# Patient Record
Sex: MALE | Race: WHITE | NOT HISPANIC OR LATINO | Employment: FULL TIME | URBAN - METROPOLITAN AREA
[De-identification: names, ages, dates, MRNs, and addresses within clinical notes are randomized per-mention and may not be internally consistent; named-entity substitution may affect disease eponyms.]

---

## 2017-02-07 ENCOUNTER — HOSPITAL ENCOUNTER (OUTPATIENT)
Dept: RADIOLOGY | Facility: MEDICAL CENTER | Age: 57
Discharge: HOME | End: 2017-02-07
Payer: COMMERCIAL

## 2017-02-07 DIAGNOSIS — R91.8 OTHER NONSPECIFIC ABNORMAL FINDING OF LUNG FIELD: ICD-10-CM

## 2017-02-07 PROCEDURE — 71250 CT THORAX DX C-: CPT

## 2017-03-02 ENCOUNTER — ALLSCRIPTS OFFICE VISIT (OUTPATIENT)
Dept: OTHER | Facility: OTHER | Age: 57
End: 2017-03-02

## 2017-03-02 ENCOUNTER — TRANSCRIBE ORDERS (OUTPATIENT)
Dept: ADMINISTRATIVE | Facility: HOSPITAL | Age: 57
End: 2017-03-02

## 2017-03-02 DIAGNOSIS — R91.1 LUNG NODULE: Primary | ICD-10-CM

## 2018-01-12 VITALS
WEIGHT: 221 LBS | TEMPERATURE: 97.4 F | OXYGEN SATURATION: 98 % | HEART RATE: 70 BPM | HEIGHT: 77 IN | DIASTOLIC BLOOD PRESSURE: 80 MMHG | BODY MASS INDEX: 26.09 KG/M2 | SYSTOLIC BLOOD PRESSURE: 132 MMHG | RESPIRATION RATE: 18 BRPM

## 2018-02-05 DIAGNOSIS — R91.8 OTHER NONSPECIFIC ABNORMAL FINDING OF LUNG FIELD (CODE): ICD-10-CM

## 2018-02-08 ENCOUNTER — HOSPITAL ENCOUNTER (OUTPATIENT)
Dept: RADIOLOGY | Facility: MEDICAL CENTER | Age: 58
Discharge: HOME | End: 2018-02-08
Payer: COMMERCIAL

## 2018-02-08 DIAGNOSIS — R91.8 OTHER NONSPECIFIC ABNORMAL FINDING OF LUNG FIELD (CODE): ICD-10-CM

## 2018-02-08 PROCEDURE — 71250 CT THORAX DX C-: CPT

## 2018-02-14 ENCOUNTER — OFFICE VISIT (OUTPATIENT)
Dept: PULMONOLOGY | Facility: CLINIC | Age: 58
End: 2018-02-14
Payer: COMMERCIAL

## 2018-02-14 VITALS
HEIGHT: 77 IN | SYSTOLIC BLOOD PRESSURE: 138 MMHG | DIASTOLIC BLOOD PRESSURE: 80 MMHG | HEART RATE: 72 BPM | BODY MASS INDEX: 26.21 KG/M2 | TEMPERATURE: 98.4 F | OXYGEN SATURATION: 96 % | WEIGHT: 222 LBS

## 2018-02-14 DIAGNOSIS — R05.3 COUGH, PERSISTENT: ICD-10-CM

## 2018-02-14 DIAGNOSIS — J43.2 CENTRILOBULAR EMPHYSEMA (HCC): Primary | ICD-10-CM

## 2018-02-14 DIAGNOSIS — R91.8 MULTIPLE PULMONARY NODULES: ICD-10-CM

## 2018-02-14 PROCEDURE — 99214 OFFICE O/P EST MOD 30 MIN: CPT | Performed by: INTERNAL MEDICINE

## 2018-02-14 RX ORDER — AMLODIPINE BESYLATE 5 MG/1
5 TABLET ORAL DAILY
Refills: 3 | COMMUNITY
Start: 2017-12-17

## 2018-02-14 RX ORDER — BENAZEPRIL HYDROCHLORIDE 40 MG/1
40 TABLET, FILM COATED ORAL DAILY
Refills: 3 | COMMUNITY
Start: 2017-12-17

## 2018-02-14 RX ORDER — SIMVASTATIN 20 MG
20 TABLET ORAL DAILY
Refills: 3 | COMMUNITY
Start: 2017-12-17

## 2018-02-14 RX ORDER — ASPIRIN 81 MG/1
1 TABLET, CHEWABLE ORAL DAILY
COMMUNITY
Start: 2016-09-01 | End: 2022-06-28 | Stop reason: ALTCHOICE

## 2018-02-14 NOTE — LETTER
February 14, 2018     Tommie Swan DO  [address] [place], [place] [id number]    Patient: Julio Moritz   YOB: 1960   Date of Visit: 2/14/2018       Dear Dr Mina Martinez: Thank you for referring Shira Ragsdale to me for evaluation  Below are my notes for this consultation  If you have questions, please do not hesitate to call me  I look forward to following your patient along with you  Sincerely,        Jared Platt MD        CC: No Recipients  Jared Platt MD  2/14/2018  9:26 PM  Signed    Progress note - Pulmonary Medicine   Julio Moritz 65 y o  male MRN: [id number]        Pulmonary emphysema ([address] )  · Fairly sedentary due to hip issues and not particularly symptomatic  · Continue to monitor off medications  If symptoms worsen he will contact me and be re-evaluated for possible bronchodilator therapy    Multiple pulmonary nodules  · CT scan imaging was reviewed with him  · Radiologist's recommendation for a follow-up CT in 6-12 months to complete 2 years of imaging was also discussed with him  · He does not wish to have any further CT scans  He does have 18 months of stability  He is no longer a smoker  The largest nodule is 4 mm in size  We will defer any further follow-up imaging per patient request    Cough, persistent  · I suspect this is secondary to esophageal reflux symptoms and some mild associated nasal drip  He is not currently on any medications for this  I did suggest a trial of Zantac or over-the-counter agent for esophageal reflux  If nasal symptoms become more significant, trial of fluticasone nasal spray or possibly an antihistamine may be of benefit  · It is unlikely that this is related to his emphysema given that he does not have sputum production, wheezing, or other signs to suggest pulmonary origin    Recommendations were provided to Wendy Troy today    He prefers to have follow-up as needed if his symptoms are worse or not improving     ______________________________________________________________________    HPI:    Otoniel Humphreys presents today for follow-up of cough and abnormal CT scan of the chest   He has been having a cough for several months  He notices some postnasal drip and has intermittent heartburn as well  He does not have any wheezing or associated phlegm production  He is on an ACE-inhibitor chronically for hypertension management  He has not had fever, chills, or infection symptoms  His weight has been stable  He has not had any signs or symptoms of malignancy  He has been fairly sedentary but due to issues with arthritis of his right hip and is contemplating evaluation for hip replacement surgery  He used to walk the dog over a mi a day and now is not able to do so  His exercise has been significantly limited  Additionally he has been having some discomfort with mobility of his tongue  This seems to be occurring at the base of the tongue on the right  His tongue seems to fatigue and cramp and he has to stop eating as a result  He does not have any history of oral pharyngeal abnormality or salivary abnormality  He does have dentures  He plans on seen ear nose and throat physician  I recommended that he follow through with that and have it evaluated further      Review of Systems:  Review of Systems   Constitutional:        As per HPI   HENT:        As per HPI   Eyes: Negative  Respiratory:        As per HPI   Cardiovascular: Negative for chest pain, palpitations and leg swelling  Gastrointestinal: Negative for abdominal pain  No GERD symptoms   Endocrine: Negative  Musculoskeletal: Positive for arthralgias (Right hip) and gait problem  Skin: Negative  Allergic/Immunologic: Negative for environmental allergies  Hematological: Negative  Psychiatric/Behavioral: Negative  All other systems reviewed and are negative          Social history updates:  History   Smoking Status    Former Smoker    Packs/day: 1 00    Years: 24 00    Types: Cigarettes    Start date: 1979    Quit date: 2004   Smokeless Tobacco    Never Used         PhysicalExamination:  Vitals:   /80   Pulse 72   Temp 98 4 °F (36 9 °C)   Ht 6' 5" (1 956 m)   Wt 101 kg (222 lb)   SpO2 96%   BMI 26 33 kg/m²      Gen:   Appears comfortable on room air without any respiratory difficulty   HEENT: PERRL  Oropharynx is clear, moist   Notable upper denture  Inspected with a tongue depressor  I do not appreciate any abnormality at the base of the tongue the right or any fullness in the submandibular for salivary gland  Neck: Supple  There is no JVD, lymphadenopathy or thyromegaly  Trachea is midline  Chest:  Chest excursion symmetric  Lungs are hyperinflated  Breath sounds are distant bilaterally but otherwise clear  Very slightly Hyper-resonant to percussion  Cardiac:  Regular  There are no murmurs  Abdomen: Soft and nontender  Benign  Extremities: No clubbing, cyanosis or edema  Neurologic: No focal deficits  Normal affect  Skin: No appreciable rashes  Diagnostic Data:  Labs: I personally reviewed the most recent laboratory data pertinent to today's visit  No recent blood work    PFT results: The most recent pulmonary function tests were reviewed  Spirometry performed in 2016 showed severe obstruction with an FEV1 of 57% predicted    Imaging:  I personally reviewed the images on the [facility] system pertinent to today's visit  CT scan of the chest shows stable emphysema changes there have been 18 months of stability in his pulmonary nodules as well    No new findings      Moe Hwang MD

## 2018-02-15 NOTE — ASSESSMENT & PLAN NOTE
· CT scan imaging was reviewed with him  · Radiologist's recommendation for a follow-up CT in 6-12 months to complete 2 years of imaging was also discussed with him  · He does not wish to have any further CT scans  He does have 18 months of stability  He is no longer a smoker  The largest nodule is 4 mm in size    We will defer any further follow-up imaging per patient request

## 2018-02-15 NOTE — ASSESSMENT & PLAN NOTE
· I suspect this is secondary to esophageal reflux symptoms and some mild associated nasal drip  He is not currently on any medications for this  I did suggest a trial of Zantac or over-the-counter agent for esophageal reflux  If nasal symptoms become more significant, trial of fluticasone nasal spray or possibly an antihistamine may be of benefit  · It is unlikely that this is related to his emphysema given that he does not have sputum production, wheezing, or other signs to suggest pulmonary origin  · If this persists in more protracted fashion, his Ace inhibitor therapy could be reconsidered since he does have benazepril therapy

## 2018-02-15 NOTE — PROGRESS NOTES
Progress note - Pulmonary Medicine   [facility], [facility]  [facility] 65 [facility]  [facility] MRN: [id number]        Pulmonary emphysema ([address] )  · Fairly sedentary due to hip issues and not particularly symptomatic  · Continue to monitor off medications  If symptoms worsen he will contact me and be re-evaluated for possible bronchodilator therapy    Multiple pulmonary nodules  · CT scan imaging was reviewed with him  · Radiologist's recommendation for a follow-up CT in 6-12 months to complete 2 years of imaging was also discussed with him  · He does not wish to have any further CT scans  He does have 18 months of stability  He is no longer a smoker  The largest nodule is 4 mm in size  We will defer any further follow-up imaging per patient request    Cough, persistent  · I suspect this is secondary to esophageal reflux symptoms and some mild associated nasal drip  He is not currently on any medications for this  I did suggest a trial of Zantac or over-the-counter agent for esophageal reflux  If nasal symptoms become more significant, trial of fluticasone nasal spray or possibly an antihistamine may be of benefit  · It is unlikely that this is related to his emphysema given that he does not have sputum production, wheezing, or other signs to suggest pulmonary origin    Recommendations were provided to Rajendra Catherine today  He prefers to have follow-up as needed if his symptoms are worse or not improving     ______________________________________________________________________    HPI:    Korey Ortez presents today for follow-up of cough and abnormal CT scan of the chest   He has been having a cough for several months  He notices some postnasal drip and has intermittent heartburn as well  He does not have any wheezing or associated phlegm production  He is on an ACE-inhibitor chronically for hypertension management  He has not had fever, chills, or infection symptoms  His weight has been stable    He has not had any signs or symptoms of malignancy  He has been fairly sedentary but due to issues with arthritis of his right hip and is contemplating evaluation for hip replacement surgery  He used to walk the dog over a mi a day and now is not able to do so  His exercise has been significantly limited  Additionally he has been having some discomfort with mobility of his tongue  This seems to be occurring at the base of the tongue on the right  His tongue seems to fatigue and cramp and he has to stop eating as a result  He does not have any history of oral pharyngeal abnormality or salivary abnormality  He does have dentures  He plans on seen ear nose and throat physician  I recommended that he follow through with that and have it evaluated further      Review of Systems:  Review of Systems   Constitutional:        As per HPI   HENT:        As per HPI   Eyes: Negative  Respiratory:        As per HPI   Cardiovascular: Negative for chest pain, palpitations and leg swelling  Gastrointestinal: Negative for abdominal pain  No GERD symptoms   Endocrine: Negative  Musculoskeletal: Positive for arthralgias (Right hip) and gait problem  Skin: Negative  Allergic/Immunologic: Negative for environmental allergies  Hematological: Negative  Psychiatric/Behavioral: Negative  All other systems reviewed and are negative  Social history updates:  History   Smoking Status    Former Smoker    Packs/day: 1 00    Years: 24 00    Types: Cigarettes    Start date: 1979    Quit date: 2004   Smokeless Tobacco    Never Used         PhysicalExamination:  Vitals:   /80   Pulse 72   Temp 98 4 °F (36 9 °C)   Ht 6' 5" (1 956 m)   Wt 101 kg (222 lb)   SpO2 96%   BMI 26 33 kg/m²     Gen:   Appears comfortable on room air without any respiratory difficulty   HEENT: PERRL  Oropharynx is clear, moist   Notable upper denture  Inspected with a tongue depressor    I do not appreciate any abnormality at the base of the tongue the right or any fullness in the submandibular for salivary gland  Neck: Supple  There is no JVD, lymphadenopathy or thyromegaly  Trachea is midline  Chest:  Chest excursion symmetric  Lungs are hyperinflated  Breath sounds are distant bilaterally but otherwise clear  Very slightly Hyper-resonant to percussion  Cardiac:  Regular  There are no murmurs  Abdomen: Soft and nontender  Benign  Extremities: No clubbing, cyanosis or edema  Neurologic: No focal deficits  Normal affect  Skin: No appreciable rashes  Diagnostic Data:  Labs: I personally reviewed the most recent laboratory data pertinent to today's visit  No recent blood work    PFT results: The most recent pulmonary function tests were reviewed  Spirometry performed in 2016 showed severe obstruction with an FEV1 of 57% predicted    Imaging:  I personally reviewed the images on the [facility] system pertinent to today's visit  CT scan of the chest shows stable emphysema changes there have been 18 months of stability in his pulmonary nodules as well    No new findings      Sylvie Littlejohn MD

## 2018-02-15 NOTE — ASSESSMENT & PLAN NOTE
· Fairly sedentary due to hip issues and not particularly symptomatic  · Continue to monitor off medications    If symptoms worsen he will contact me and be re-evaluated for possible bronchodilator therapy

## 2018-02-15 NOTE — PATIENT INSTRUCTIONS
· Consider trial of Zantac or Pepcid for your heartburn which may help with her cough  · Keep track of postnasal drip and consider over-the-counter treatment for this if it is not improving  · Follow-up with ear nose and throat regarding the complaints of tongue discomfort while eating  · If the cough does not improve over time, consider having a or benazepril changed to an alternative medication  Benazepril is an ACE-inhibitor which can sometimes cause cough in up to 40% of patient that take this medication  · The pulmonary nodules that you have have been stable for 18 months  Recommendation by Radiology was for 1 additional follow-up CT scan in 6-12 months  Please call if you reconsider having the scan performed    I have recommended a single 1 year follow-up

## 2018-04-03 ENCOUNTER — TRANSCRIBE ORDERS (OUTPATIENT)
Dept: ADMINISTRATIVE | Facility: HOSPITAL | Age: 58
End: 2018-04-03

## 2018-04-03 DIAGNOSIS — K14.0 GLOSSITIS: ICD-10-CM

## 2018-04-03 DIAGNOSIS — R13.14 DYSPHAGIA, PHARYNGOESOPHAGEAL PHASE: Primary | ICD-10-CM

## 2018-04-20 ENCOUNTER — APPOINTMENT (OUTPATIENT)
Dept: LAB | Facility: MEDICAL CENTER | Age: 58
End: 2018-04-20
Payer: COMMERCIAL

## 2018-04-20 ENCOUNTER — HOSPITAL ENCOUNTER (OUTPATIENT)
Dept: RADIOLOGY | Facility: MEDICAL CENTER | Age: 58
Discharge: HOME | End: 2018-04-20
Payer: COMMERCIAL

## 2018-04-20 ENCOUNTER — TRANSCRIBE ORDERS (OUTPATIENT)
Dept: ADMINISTRATIVE | Facility: HOSPITAL | Age: 58
End: 2018-04-20

## 2018-04-20 DIAGNOSIS — K14.0 GLOSSITIS: ICD-10-CM

## 2018-04-20 DIAGNOSIS — R13.14 DYSPHAGIA, PHARYNGOESOPHAGEAL PHASE: ICD-10-CM

## 2018-04-20 DIAGNOSIS — R13.14 DYSPHAGIA, PHARYNGOESOPHAGEAL PHASE: Primary | ICD-10-CM

## 2018-04-20 LAB
ALBUMIN SERPL BCP-MCNC: 4.4 G/DL (ref 3.5–5)
ALP SERPL-CCNC: 77 U/L (ref 46–116)
ALT SERPL W P-5'-P-CCNC: 26 U/L (ref 12–78)
ANION GAP SERPL CALCULATED.3IONS-SCNC: 3 MMOL/L (ref 4–13)
AST SERPL W P-5'-P-CCNC: 15 U/L (ref 5–45)
BILIRUB SERPL-MCNC: 1.05 MG/DL (ref 0.2–1)
BUN SERPL-MCNC: 9 MG/DL (ref 5–25)
CALCIUM SERPL-MCNC: 9.4 MG/DL (ref 8.3–10.1)
CHLORIDE SERPL-SCNC: 109 MMOL/L (ref 100–108)
CO2 SERPL-SCNC: 29 MMOL/L (ref 21–32)
CREAT SERPL-MCNC: 1.12 MG/DL (ref 0.6–1.3)
GFR SERPL CREATININE-BSD FRML MDRD: 73 ML/MIN/1.73SQ M
GLUCOSE P FAST SERPL-MCNC: 94 MG/DL (ref 65–99)
POTASSIUM SERPL-SCNC: 4.5 MMOL/L (ref 3.5–5.3)
PROT SERPL-MCNC: 7.8 G/DL (ref 6.4–8.2)
SODIUM SERPL-SCNC: 141 MMOL/L (ref 136–145)

## 2018-04-20 PROCEDURE — 36415 COLL VENOUS BLD VENIPUNCTURE: CPT | Performed by: OTOLARYNGOLOGY

## 2018-04-20 PROCEDURE — 80053 COMPREHEN METABOLIC PANEL: CPT | Performed by: OTOLARYNGOLOGY

## 2018-04-25 ENCOUNTER — HOSPITAL ENCOUNTER (OUTPATIENT)
Dept: RADIOLOGY | Facility: MEDICAL CENTER | Age: 58
Discharge: HOME | End: 2018-04-25
Payer: COMMERCIAL

## 2018-04-25 PROCEDURE — 70491 CT SOFT TISSUE NECK W/DYE: CPT

## 2018-04-25 RX ADMIN — IOHEXOL 85 ML: 350 INJECTION, SOLUTION INTRAVENOUS at 09:42

## 2019-03-20 ENCOUNTER — OFFICE VISIT (OUTPATIENT)
Dept: PULMONOLOGY | Facility: CLINIC | Age: 59
End: 2019-03-20
Payer: COMMERCIAL

## 2019-03-20 VITALS
OXYGEN SATURATION: 98 % | WEIGHT: 221.4 LBS | TEMPERATURE: 96.7 F | HEART RATE: 74 BPM | DIASTOLIC BLOOD PRESSURE: 98 MMHG | SYSTOLIC BLOOD PRESSURE: 148 MMHG | HEIGHT: 76 IN | BODY MASS INDEX: 26.96 KG/M2 | RESPIRATION RATE: 18 BRPM

## 2019-03-20 DIAGNOSIS — R05.3 COUGH, PERSISTENT: ICD-10-CM

## 2019-03-20 DIAGNOSIS — J42 CHRONIC BRONCHITIS, UNSPECIFIED CHRONIC BRONCHITIS TYPE (HCC): Primary | ICD-10-CM

## 2019-03-20 DIAGNOSIS — J43.2 CENTRILOBULAR EMPHYSEMA (HCC): ICD-10-CM

## 2019-03-20 PROCEDURE — 94010 BREATHING CAPACITY TEST: CPT | Performed by: INTERNAL MEDICINE

## 2019-03-20 PROCEDURE — 99214 OFFICE O/P EST MOD 30 MIN: CPT | Performed by: INTERNAL MEDICINE

## 2019-03-20 RX ORDER — ALBUTEROL SULFATE 90 UG/1
2 AEROSOL, METERED RESPIRATORY (INHALATION) EVERY 6 HOURS PRN
Qty: 1 INHALER | Refills: 6 | Status: SHIPPED | OUTPATIENT
Start: 2019-03-20 | End: 2020-03-31 | Stop reason: SDUPTHER

## 2019-03-20 NOTE — PROGRESS NOTES
Progress note - Pulmonary Medicine   [facility], [facility]  AND [facility] 63 y o  male MRN: [id number]       Impression & Plan:     Pulmonary emphysema ([address] )  · I gave him a sample of Anoro Ellipta to try to see if this helps any of his respiratory symptoms  If it does he was also given a prescription to fill  · If Haseeb Hove is not helpful, he will try rescue albuterol in the winter months which seem to be when his flares occur  Prescription was given for this as well    Cough, persistent  · Dry nonproductive and seasonal cough  · This may be related to an element of bronchospasm and inhalers will be tried typically improves during the spring and summer months  · If symptoms escalate, will re-evaluate this winter  Otherwise trial of bronchodilators as outlined above      Diagnoses and all orders for this visit:    Chronic bronchitis, unspecified chronic bronchitis type ([address] )  -     POCT spirometry    Centrilobular emphysema (HCC)  -     albuterol (PROVENTIL HFA,VENTOLIN HFA) 90 mcg/act inhaler; Inhale 2 puffs every 6 (six) hours as needed for wheezing  -     umeclidinium-vilanterol (ANORO ELLIPTA) 62 5-25 MCG/INH inhaler; Inhale 1 puff daily    Cough, persistent  -     albuterol (PROVENTIL HFA,VENTOLIN HFA) 90 mcg/act inhaler; Inhale 2 puffs every 6 (six) hours as needed for wheezing      ______________________________________________________________________    HPI:    Nahun Fontaine presents today for follow-up of emphysema/COPD  He had been doing well since our last visit until this winter when he developed significant chest tightness in the right upper lobe and a dry cough  He did not seek medical attention for this but was very aware of the discomfort and shortness of Breath  He did not typically wheeze  He did not typically have phlegm production  He did have a cough which was dry and bothersome  He did not recollect any fever or infection no sinusitis or postnasal drip    No sore throat and no GERD symptoms    He has not had any cardiac chest pain  No leg swelling or palpitations  Symptoms were not reproducible and he is not having musculoskeletal injury to the chest   Findings were similar to his breathing problems in the past        Review of Systems:  Review of Systems   Constitutional: Negative for activity change, appetite change, fatigue, fever and unexpected weight change  HENT: Negative for postnasal drip, sinus pain, sore throat and voice change  Cardiovascular: Negative  Gastrointestinal: Negative  Endocrine: Negative  Musculoskeletal: Negative  Allergic/Immunologic: Negative for environmental allergies  Neurological: Negative for headaches  All other systems reviewed and are negative  Past medical history, surgical history, and family history were reviewed and updated as appropriate    Social history updates:  Social History     Tobacco Use   Smoking Status Former Smoker    Packs/day: 1 00    Years: 24 00    Pack years: 24 00    Types: Cigarettes    Start date: 1979    Last attempt to quit: 2004    Years since quitting: 15 2   Smokeless Tobacco Never Used       PhysicalExamination:  Vitals:   /98 (BP Location: Left arm, Patient Position: Sitting, Cuff Size: Large)   Pulse 74   Temp (!) 96 7 °F (35 9 °C) (Tympanic)   Resp 18   Ht 6' 4" (1 93 m)   Wt 100 kg (221 lb 6 4 oz)   SpO2 98% Comment: room air  BMI 26 95 kg/m²     Physical Exam:   Gen: Comfortable  Non-labored  Normal weight/stature  HEENT: PERRL  O/P: clear  moist  Neck: Trachea is midline  No JVD  No adenopathy  No thyromegaly  Chest:  Symmetric excursion  Clear lung fields without wheezes, rales, or rhonchi  Cardiac:  Regular  no murmur  Abdomen: Soft and nontender  Bowel sounds are present  Extremities: No edema  Neuro: no impaired mentation, good historian    Diagnostic Data:  Labs:   I personally reviewed the most recent laboratory data pertinent to today's visit    No results found for: WBC, HGB, HCT, MCV, PLT  Lab Results   Component Value Date    SODIUM 141 04/20/2018    K 4 5 04/20/2018    CO2 29 04/20/2018     (H) 04/20/2018    BUN 9 04/20/2018    CREATININE 1 12 04/20/2018    CALCIUM 9 4 04/20/2018     No results found for: IGE  Lab Results   Component Value Date    ALT 26 04/20/2018    AST 15 04/20/2018    ALKPHOS 77 04/20/2018       PFT results: The most recent pulmonary function tests were reviewed  Spirometry performed in the office today showed FEV1 of 2 36 L which is 53% predicted which was a slight decline from prior FEV1 of 2 69 L which was 57% predicted    Imaging:  I personally [facility] pertinent to today's visit  He had 3 consecutive CT scans for small lung nodules all    Last imaging in 2018 and was unremarkable for significant  change or concerning nodule     Tayler Silverio MD

## 2019-03-20 NOTE — ASSESSMENT & PLAN NOTE
· I gave him a sample of Anoro Ellipta to try to see if this helps any of his respiratory symptoms  If it does he was also given a prescription to fill  · If Haseeb Hove is not helpful, he will try rescue albuterol in the winter months which seem to be when his flares occur    Prescription was given for this as well

## 2019-03-20 NOTE — ASSESSMENT & PLAN NOTE
· Dry nonproductive and seasonal cough  · This may be related to an element of bronchospasm and inhalers will be tried typically improves during the spring and summer months  · If symptoms escalate, will re-evaluate this winter    Otherwise trial of bronchodilators as outlined above

## 2020-03-31 ENCOUNTER — TELEMEDICINE (OUTPATIENT)
Dept: PULMONOLOGY | Facility: CLINIC | Age: 60
End: 2020-03-31
Payer: COMMERCIAL

## 2020-03-31 DIAGNOSIS — R07.89 ATYPICAL CHEST PAIN: ICD-10-CM

## 2020-03-31 DIAGNOSIS — R91.8 MULTIPLE PULMONARY NODULES: ICD-10-CM

## 2020-03-31 DIAGNOSIS — J43.2 CENTRILOBULAR EMPHYSEMA (HCC): Primary | ICD-10-CM

## 2020-03-31 DIAGNOSIS — R05.3 COUGH, PERSISTENT: ICD-10-CM

## 2020-03-31 PROCEDURE — 99214 OFFICE O/P EST MOD 30 MIN: CPT | Performed by: INTERNAL MEDICINE

## 2020-03-31 RX ORDER — ALBUTEROL SULFATE 90 UG/1
2 AEROSOL, METERED RESPIRATORY (INHALATION) EVERY 6 HOURS PRN
Qty: 1 INHALER | Refills: 6 | Status: SHIPPED | OUTPATIENT
Start: 2020-03-31 | End: 2022-02-04

## 2020-03-31 NOTE — LETTER
March 31, 2020     Pacheco Zhong DO  [address] [address]    Patient: Catracho Ashraf   YOB: 1960   Date of Visit: 3/31/2020       Dear Dr Chantel Menendez: Thank you for referring Santana Levine to me for evaluation  Below are my notes for this consultation  If you have questions, please do not hesitate to call me  I look forward to following your patient along with you  Sincerely,        Laura Winters MD        CC: No Recipients  Laura Winters MD  3/31/2020  2:58 PM  Sign at close encounter  Virtual Regular Visit    Reason for visit is follow-up of emphysema and atypical chest wall pain    This virtual check-in was done via [employer] Duo and patient was informed that this is not a secure, HIPAA-complaint platform  he agrees to proceed       Encounter provider Laura Winters MD    Provider located at [address] [facility]      Recent Visits  No visits were found meeting these conditions  Showing recent visits within past 7 days and meeting all other requirements     Today's Visits  Date Type Provider Dept   03/31/20 Telemedicine Laura Winters MD [facility]   Showing today's visits and meeting all other requirements     Future Appointments  No visits were found meeting these conditions  Showing future appointments within next 150 days and meeting all other requirements        Patient agrees to participate in a virtual check in via telephone or video visit instead of presenting to the office to address urgent/immediate medical needs  Patient is aware this is a billable service  After connecting through televideo, the patient was identified by name and date of birth  Catracho Ashraf was informed that this was a telemedicine visit and that the exam was being conducted confidentially over secure lines  My office door was closed   No one else was in the room  He acknowledged consent and understanding of privacy and security of the virtual check-in visit  I informed the patient that I have reviewed his record in Epic and presented the opportunity for him to ask any questions regarding the visit today  The patient initiated communication and agreed to participate  Progress note - Pulmonary Medicine   [facility], [facility]  AND [facility] 63 y o  male MRN: [id number]       Impression & Plan:     Pulmonary emphysema ([address] )  · Symptoms are stable  · He has not had any worsening emphysema symptoms  · Did not notice a benefit with Anoro Ellipta and has only been using albuterol intermittently  · At this point will continue with just intermittent albuterol  Prescription renewed per patient request    Multiple pulmonary nodules  · He has a he 4 mm left lower lobe nodule that was present previously  He declined further workup at that time  · Questioning whether he should have a CT scan at this point but based on the size of prior nodule, risk factors and remote nature of his smoking history  Follow-up imaging would not be recommended based on Fleischner Society guidelines and lung cancer screening criteria  Atypical chest pain  · Continues to have this sporadically without rhyme or reason  Symptoms typically last about 10 days and are self-limited  · No clear structural origin and likely musculoskeletal most likely          Virtual visit time component:  Total visit time for chart and diagnostic data review, telephonic or video conference communication with the patient, and documentation:  21 minutes    I have personally spent 6 minutes reviewing the chart and imaging prior to the visit  I have personally spent 13 minutes on the phone with the patient       I have personally spent 3 minutes reviewing imaging, laboratory results and other testing results with the patient     ______________________________________________________________________    HPI: Sarahi Snyder is being evaluated by virtual visit for follow-up of emphysema and atypical chest wall pain  He has been fairly stable  He tried [employer] after the last visit but did not notice any benefit  He return to using albuterol alone which he uses during the day time and fairly sporadically  He continues to have occasional chest wall pain  This occurs every couple of months and may last for up to 10 days  It seems to be self limited any does not require any pain medication for this  He is not aware of a triggering issue and remains somewhat puzzled as to why he continues to have the discomfort  He has never had any structural abnormality to explain this phenomenon  He otherwise continues to work during the [facility] [employer]  He works for [email or web address]  They do have a waiver to continue operations but he is wearing a mask and maintaining 6 ft distancing  He denies cough  No wheezing  He occasionally gets chest tightness  No lightheadedness or dizziness  No sore throat  No postnasal drip  No GERD symptoms  Weight and appetite have been stable    No fever, chills, or other constitutional complaints        Current Medications:    Current Outpatient Medications:     albuterol (PROVENTIL HFA,VENTOLIN HFA) 90 mcg/act inhaler, Inhale 2 puffs every 6 (six) hours as needed for wheezing, Disp: 1 Inhaler, Rfl: 6    amLODIPine (NORVASC) 5 mg tablet, Take 5 mg by mouth daily, Disp: , Rfl: 3    aspirin 81 mg chewable tablet, Chew 1 tablet daily, Disp: , Rfl:     benazepril (LOTENSIN) 40 MG tablet, Take 40 mg by mouth daily, Disp: , Rfl: 3    Fish Oil-Coenzyme Q10 (FISH OIL PLUS CO Q-10) 1000-30 MG CAPS, Take by mouth, Disp: , Rfl:     simvastatin (ZOCOR) 20 mg tablet, Take 20 mg by mouth daily, Disp: , Rfl: 3    Review of Systems:  Aside from what is mentioned in the HPI, the review of systems is otherwise negative  Past medical history, surgical history, and family history were reviewed and updated as appropriate    Social history updates:  Social History     Tobacco Use   Smoking Status Former Smoker    Packs/day: 1 00    Years: 24 00    Pack years: 24 00    Types: Cigarettes    Start date:     Last attempt to quit: 2004    Years since quittin 2   Smokeless Tobacco Never Used       PhysicalExamination:  Patient was evaluated telephonically, no physical examination could be performed  Patient was briefly seen on tele video but unable to maintain video conferencing      Diagnostic Data:    Imaging:  No new recent imagine     Hoda Zheng MD

## 2020-03-31 NOTE — PROGRESS NOTES
Virtual Regular Visit    Reason for visit is follow-up of emphysema and atypical chest wall pain    This virtual check-in was done via [employer] Duo and patient was informed that this is not a secure, HIPAA-complaint platform  he agrees to proceed       Encounter provider Eda Bill MD    Provider located at [address] [facility]      Recent Visits  No visits were found meeting these conditions  Showing recent visits within past 7 days and meeting all other requirements     Today's Visits  Date Type Provider Dept   03/31/20 Telemedicine Eda Bill MD [facility]   Showing today's visits and meeting all other requirements     Future Appointments  No visits were found meeting these conditions  Showing future appointments within next 150 days and meeting all other requirements        Patient agrees to participate in a virtual check in via telephone or video visit instead of presenting to the office to address urgent/immediate medical needs  Patient is aware this is a billable service  After connecting through [employer], the patient was identified by name and date of birth  Lai Martínez was informed that this was a telemedicine visit and that the exam was being conducted confidentially over secure lines  My office door was closed  No one else was in the room  He acknowledged consent and understanding of privacy and security of the virtual check-in visit  I informed the patient that I have reviewed his record in Epic and presented the opportunity for him to ask any questions regarding the visit today  The patient initiated communication and agreed to participate        Progress note - Pulmonary Medicine   [facility], [facility]  AND [facility] 63 y o  male MRN: [id number]       Impression & Plan:     Pulmonary emphysema ([address] )  · Symptoms are stable  · He has not had any worsening emphysema symptoms  · Did not notice a benefit with Anoro Ellipta and has only been using albuterol intermittently  · At this point will continue with just intermittent albuterol  Prescription renewed per patient request    Multiple pulmonary nodules  · He has a he 4 mm left lower lobe nodule that was present previously  He declined further workup at that time  · Questioning whether he should have a CT scan at this point but based on the size of prior nodule, risk factors and remote nature of his smoking history  Follow-up imaging would not be recommended based on Fleischner Society guidelines and lung cancer screening criteria  Atypical chest pain  · Continues to have this sporadically without rhyme or reason  Symptoms typically last about 10 days and are self-limited  · No clear structural origin and likely musculoskeletal most likely          Virtual visit time component:  Total visit time for chart and diagnostic data review, telephonic or video conference communication with the patient, and documentation:  21 minutes    I have personally spent 6 minutes reviewing the chart and imaging prior to the visit  I have personally spent 13 minutes on the phone with the patient  I have personally spent 3 minutes reviewing imaging, laboratory results and other testing results with the patient     ______________________________________________________________________    HPI:    Val Howell is being evaluated by virtual visit for follow-up of emphysema and atypical chest wall pain  He has been fairly stable  He tried [employer] after the last visit but did not notice any benefit  He return to using albuterol alone which he uses during the day time and fairly sporadically  He continues to have occasional chest wall pain  This occurs every couple of months and may last for up to 10 days  It seems to be self limited any does not require any pain medication for this    He is not aware of a triggering issue and remains somewhat puzzled as to why he continues to have the discomfort  He has never had any structural abnormality to explain this phenomenon  He otherwise continues to work during the COVID-23 crisis  He works for [employer]  They do have a waiver to continue operations but he is wearing a mask and maintaining 6 ft distancing  He denies cough  No wheezing  He occasionally gets chest tightness  No lightheadedness or dizziness  No sore throat  No postnasal drip  No GERD symptoms  Weight and appetite have been stable  No fever, chills, or other constitutional complaints        Current Medications:    Current Outpatient Medications:     albuterol (PROVENTIL HFA,VENTOLIN HFA) 90 mcg/act inhaler, Inhale 2 puffs every 6 (six) hours as needed for wheezing, Disp: 1 Inhaler, Rfl: 6    amLODIPine (NORVASC) 5 mg tablet, Take 5 mg by mouth daily, Disp: , Rfl: 3    aspirin 81 mg chewable tablet, Chew 1 tablet daily, Disp: , Rfl:     benazepril (LOTENSIN) 40 MG tablet, Take 40 mg by mouth daily, Disp: , Rfl: 3    Fish Oil-Coenzyme Q10 (FISH OIL PLUS CO Q-10) 1000-30 MG CAPS, Take by mouth, Disp: , Rfl:     simvastatin (ZOCOR) 20 mg tablet, Take 20 mg by mouth daily, Disp: , Rfl: 3    Review of Systems:  Aside from what is mentioned in the HPI, the review of systems is otherwise negative  Past medical history, surgical history, and family history were reviewed and updated as appropriate    Social history updates:  Social History     Tobacco Use   Smoking Status Former Smoker    Packs/day: 1 00    Years: 24 00    Pack years: 24 00    Types: Cigarettes    Start date:     Last attempt to quit: 2004    Years since quittin 2   Smokeless Tobacco Never Used       PhysicalExamination:  Patient was evaluated telephonically, no physical examination could be performed  Patient was briefly seen on tele video but unable to maintain video conferencing      Diagnostic Data:    Imaging:  No new recent imagine     Adilson Jennings MD

## 2020-03-31 NOTE — ASSESSMENT & PLAN NOTE
· Continues to have this sporadically without rhyme or reason    Symptoms typically last about 10 days and are self-limited  · No clear structural origin and likely musculoskeletal most likely

## 2020-03-31 NOTE — ASSESSMENT & PLAN NOTE
· He has a he 4 mm left lower lobe nodule that was present previously  He declined further workup at that time  · Questioning whether he should have a CT scan at this point but based on the size of prior nodule, risk factors and remote nature of his smoking history  Follow-up imaging would not be recommended based on Fleischner Society guidelines and lung cancer screening criteria

## 2020-10-30 ENCOUNTER — TELEPHONE (OUTPATIENT)
Dept: PULMONOLOGY | Facility: CLINIC | Age: 60
End: 2020-10-30

## 2021-03-30 ENCOUNTER — OFFICE VISIT (OUTPATIENT)
Dept: PULMONOLOGY | Facility: CLINIC | Age: 61
End: 2021-03-30
Payer: COMMERCIAL

## 2021-03-30 VITALS
HEIGHT: 76 IN | SYSTOLIC BLOOD PRESSURE: 130 MMHG | DIASTOLIC BLOOD PRESSURE: 76 MMHG | HEART RATE: 64 BPM | TEMPERATURE: 97.8 F | OXYGEN SATURATION: 98 % | BODY MASS INDEX: 26.55 KG/M2 | WEIGHT: 218 LBS

## 2021-03-30 DIAGNOSIS — R91.8 MULTIPLE PULMONARY NODULES: ICD-10-CM

## 2021-03-30 DIAGNOSIS — J43.2 CENTRILOBULAR EMPHYSEMA (HCC): Primary | ICD-10-CM

## 2021-03-30 DIAGNOSIS — R93.89 ABNORMAL CT OF THE CHEST: ICD-10-CM

## 2021-03-30 PROCEDURE — 99213 OFFICE O/P EST LOW 20 MIN: CPT | Performed by: INTERNAL MEDICINE

## 2021-03-30 RX ORDER — SERTRALINE HYDROCHLORIDE 100 MG/1
5 TABLET, FILM COATED ORAL
COMMUNITY
End: 2022-06-28 | Stop reason: ALTCHOICE

## 2021-03-30 RX ORDER — DOXAZOSIN 2 MG/1
2 TABLET ORAL DAILY
COMMUNITY
Start: 2021-02-24

## 2021-03-31 NOTE — ASSESSMENT & PLAN NOTE
· Had no benefit from Anoro Ellipta  · Recently using Asmanex without benefit and I recommended discontinuation  · Was taking albuterol daily which was not of benefit and I did reinstruct him that he should be taking this as needed and should consider trialing a prior to sustained exertion   · Predominant symptoms are related to emphysema without any significant response to inhaler agents    · At this point in time would slowly maintain rescue bronchodilator

## 2021-03-31 NOTE — ASSESSMENT & PLAN NOTE
· Given complaints of chronic dyspnea and prior imaging with reticular nodular and basilar scarring, I did suggest that we repeat his CT scan  · Pulmonary nodules are of low risk previously  Does not meet lung cancer screening criteria based on smoking history  · CT imaging is for follow-up of potential scarring/early interstitial lung disease    I do not feel that high-resolution CT imaging is warranted at this time however and noncontrast standard CT scan will be performed

## 2021-03-31 NOTE — PROGRESS NOTES
Progress note - Pulmonary Medicine   [facility], [facility]  [facility] 62 [facility]  [facility] MRN: [id number]       Impression & Plan:     Pulmonary emphysema ([address] )  · Had no benefit from Anoro Ellipta  · Recently using Asmanex without benefit and I recommended discontinuation  · Was taking albuterol daily which was not of benefit and I did reinstruct him that he should be taking this as needed and should consider trialing a prior to sustained exertion   · Predominant symptoms are related to emphysema without any significant response to inhaler agents  · At this point in time would slowly maintain rescue bronchodilator    Abnormal CT of the chest  · Given complaints of chronic dyspnea and prior imaging with reticular nodular and basilar scarring, I did suggest that we repeat his CT scan  · Pulmonary nodules are of low risk previously  Does not meet lung cancer screening criteria based on smoking history  · CT imaging is for follow-up of potential scarring/early interstitial lung disease  I do not feel that high-resolution CT imaging is warranted at this time however and noncontrast standard CT scan will be performed    I will be in contact with him with the results of the CT imaging  Otherwise will plan on continued annual follow-up  ______________________________________________________________________    HPI:    Leidy Irby presents today for follow-up of emphysema and prior abnormal CT imaging  He has not reported any new symptoms  He does still get some shortness of breath intermittently  He has not had any benefit from inhaler therapy  He has tried [employer] in the past   He was recently placed on Asmanex by his primary care physician and this did not offer any benefit either  He has been using 2 puffs of albuterol daily but was not using as prescribed  Previously this was prescribed on an as-needed basis  It was not intended for daily her regular use given lack of bronchodilator responsiveness        He does have frequent throat clearing postnasal drip  He has rarely taken in occasional Claritin but has not taken this consistently  He has never used nasal sprays or sinus rinses  No GERD symptoms to report  He does not a chronic cough  He describes it more as a sensation of throat clearing and fullness in the base of his throat  Symptoms highly suggest either LPR that is on recognized or nasal drip  No other new medical complaints to report  No chest pain or cardiac complaints  No fever, chills, or infection symptoms    No weight or appetite change    Current Medications:    Current Outpatient Medications:     albuterol (PROVENTIL HFA,VENTOLIN HFA) 90 mcg/act inhaler, Inhale 2 puffs every 6 (six) hours as needed for wheezing, Disp: 1 Inhaler, Rfl: 6    amLODIPine (NORVASC) 5 mg tablet, Take 5 mg by mouth daily, Disp: , Rfl: 3    aspirin 81 mg chewable tablet, Chew 1 tablet daily, Disp: , Rfl:     benazepril (LOTENSIN) 40 MG tablet, Take 40 mg by mouth daily, Disp: , Rfl: 3    Fish Oil-Coenzyme Q10 (FISH OIL PLUS CO Q-10) 1000-30 MG CAPS, Take by mouth, Disp: , Rfl:     sertraline (ZOLOFT) 100 mg tablet, Take 5 mg by mouth, Disp: , Rfl:     simvastatin (ZOCOR) 20 mg tablet, Take 20 mg by mouth daily, Disp: , Rfl: 3    doxazosin (CARDURA) 2 mg tablet, Take 2 mg by mouth daily, Disp: , Rfl:     sertraline (ZOLOFT) 50 mg tablet, Take 50 mg by mouth daily, Disp: , Rfl:     Review of Systems:  Aside from what is mentioned in the HPI, the review of systems is otherwise negative    Past medical history, surgical history, and family history were reviewed and updated as appropriate    Social history updates:  Social History     Tobacco Use   Smoking Status Former Smoker    Packs/day: 1 00    Years: 24 00    Pack years: 24 00    Types: Cigarettes    Start date:     Quit date: 2004    Years since quittin 2   Smokeless Tobacco Never Used       PhysicalExamination:  Vitals:   /76 (BP Location: Left arm, Patient Position: Sitting, Cuff Size: Standard)   Pulse 64   Temp 97 8 °F (36 6 °C) (Tympanic)   Ht 6' 4" (1 93 m)   Wt 98 9 kg (218 lb)   SpO2 98%   BMI 26 54 kg/m²   Gen: Comfortable  Non-labored  HEENT: PERRL  O/P:  Exam deferred due to COVID-19 and face mask  Neck: Trachea is midline  No JVD  No adenopathy  Chest: Symmetric with slightly distant breath sounds and no wheezing   Cardiac:  Regular  no murmur  Abdomen: Soft and nontender  Bowel sounds are present  Extremities: No edema    Diagnostic Data:  Labs: I personally reviewed the most recent laboratory data pertinent to today's visit    No results found for: WBC, HGB, HCT, MCV, PLT  Lab Results   Component Value Date    SODIUM 141 04/20/2018    K 4 5 04/20/2018    CO2 29 04/20/2018     (H) 04/20/2018    BUN 9 04/20/2018    CREATININE 1 12 04/20/2018    CALCIUM 9 4 04/20/2018       Imaging:  I personally reviewed the images on the [facility] system pertinent to today's visit  Last imaging in 2018 with some mild scattered and basilar predominant scarring changes    Tiny 4 mm nodule had been stable    Vonnie Leiva MD

## 2021-04-01 ENCOUNTER — TELEPHONE (OUTPATIENT)
Dept: PULMONOLOGY | Facility: CLINIC | Age: 61
End: 2021-04-01

## 2021-04-12 DIAGNOSIS — J43.2 CENTRILOBULAR EMPHYSEMA (HCC): Primary | ICD-10-CM

## 2021-04-12 NOTE — PROGRESS NOTES
Insurance denial phone note- Pulmonary Medicine   Octavio Sylvester 62 y o  male MRN: [id number]    Reason for call:  Noncontrast CT scan of the chest was denied      Pertinent details:  Denial for CT scan by [facility]  Unclear if they are requesting HRCT as alternative but requesting complete pulmonary function testing prior to approval   He has had prior abnormal spirometry  with severe obstructive ventilatory flow limitation related to his emphysema  It is expected that he will have abnormal  Complete pulmonary function testing given his complaints of dyspnea and abnormal spirometry in the past  Will order complete pulmonary function study to accommodate the  denial request, even though I personally feel these are unnecessary  He has prior abnormal CT scan with interstitial changes, complaints of ongoing dyspnea, and known  COPD    On the basis of these symptoms and prior imaging alone, CT scan imaging is indicated    Marleni Ambrocio MD

## 2021-04-12 NOTE — Clinical Note
Please see my phone note  I have ordered complete pulmonary function study  Please  notify the patient that this is required in order for his insurance company to approve his CT scan        Thanks

## 2021-05-03 ENCOUNTER — HOSPITAL ENCOUNTER (OUTPATIENT)
Dept: RADIOLOGY | Facility: MEDICAL CENTER | Age: 61
Discharge: HOME | End: 2021-05-03
Payer: COMMERCIAL

## 2021-05-03 DIAGNOSIS — R91.8 MULTIPLE PULMONARY NODULES: ICD-10-CM

## 2021-05-03 PROCEDURE — 71250 CT THORAX DX C-: CPT

## 2021-05-03 PROCEDURE — G1004 CDSM NDSC: HCPCS

## 2021-07-13 ENCOUNTER — VBI (OUTPATIENT)
Dept: ADMINISTRATIVE | Facility: OTHER | Age: 61
End: 2021-07-13

## 2021-07-13 NOTE — TELEPHONE ENCOUNTER
07/13/21 4:23 PM     See documentation in the [employer] SmartForm       Kianna Dean, [employer]

## 2021-09-05 ENCOUNTER — OFFICE VISIT (OUTPATIENT)
Dept: URGENT CARE | Facility: MEDICAL CENTER | Age: 61
End: 2021-09-05
Payer: COMMERCIAL

## 2021-09-05 VITALS
TEMPERATURE: 98.2 F | SYSTOLIC BLOOD PRESSURE: 163 MMHG | DIASTOLIC BLOOD PRESSURE: 84 MMHG | RESPIRATION RATE: 18 BRPM | WEIGHT: 225 LBS | HEART RATE: 80 BPM | BODY MASS INDEX: 27.4 KG/M2 | OXYGEN SATURATION: 96 % | HEIGHT: 76 IN

## 2021-09-05 DIAGNOSIS — R39.9 UTI SYMPTOMS: Primary | ICD-10-CM

## 2021-09-05 LAB
SL AMB  POCT GLUCOSE, UA: ABNORMAL
SL AMB LEUKOCYTE ESTERASE,UA: ABNORMAL
SL AMB POCT BILIRUBIN,UA: ABNORMAL
SL AMB POCT BLOOD,UA: ABNORMAL
SL AMB POCT CLARITY,UA: CLEAR
SL AMB POCT COLOR,UA: ABNORMAL
SL AMB POCT KETONES,UA: ABNORMAL
SL AMB POCT NITRITE,UA: ABNORMAL
SL AMB POCT PH,UA: 5
SL AMB POCT SPECIFIC GRAVITY,UA: 1.03
SL AMB POCT URINE PROTEIN: ABNORMAL
SL AMB POCT UROBILINOGEN: 0.2

## 2021-09-05 PROCEDURE — 99213 OFFICE O/P EST LOW 20 MIN: CPT | Performed by: PHYSICIAN ASSISTANT

## 2021-09-05 PROCEDURE — 87086 URINE CULTURE/COLONY COUNT: CPT | Performed by: PHYSICIAN ASSISTANT

## 2021-09-05 PROCEDURE — 81002 URINALYSIS NONAUTO W/O SCOPE: CPT | Performed by: PHYSICIAN ASSISTANT

## 2021-09-05 NOTE — PROGRESS NOTES
[facility]        NAME: Francetta Merlin is a 62 y o  male  : 1960    MRN: [id number]  DATE: 2021  TIME: 10:14 AM    Assessment and Plan   UTI symptoms [R39 9]  1  UTI symptoms  POCT urine dip    Urine culture         Patient Instructions     1  Increase fluids  2  Motrin as needed for discomfort  3  Follow-up with Urology if symptoms persist    Chief Complaint     Chief Complaint   Patient presents with    Possible UTI     Pt  with frequency and pelvic discomfort that began two days ago  History of Present Illness         Mariela Beyer is a 81-year-old male presents with a 2 day history of urinary frequency and lower abdominal discomfort  Patient denies any fever but did report night sweats last evening, he denies any back pain or visible blood in his urine  Patient does have a history of BPH, he denies H/O recurrent urinary tract infections  Review of Systems   Review of Systems   Constitutional: Negative  Gastrointestinal: Negative  Genitourinary: Positive for frequency  Negative for dysuria and hematuria           Current Medications       Current Outpatient Medications:     albuterol (PROVENTIL HFA,VENTOLIN HFA) 90 mcg/act inhaler, Inhale 2 puffs every 6 (six) hours as needed for wheezing, Disp: 1 Inhaler, Rfl: 6    amLODIPine (NORVASC) 5 mg tablet, Take 5 mg by mouth daily, Disp: , Rfl: 3    benazepril (LOTENSIN) 40 MG tablet, Take 40 mg by mouth daily, Disp: , Rfl: 3    doxazosin (CARDURA) 2 mg tablet, Take 2 mg by mouth daily, Disp: , Rfl:     mometasone (ASMANEX TWISTHALER) 220 MCG/INH inhaler, Inhale 1 puff every evening Rinse mouth after use , Disp: , Rfl:     simvastatin (ZOCOR) 20 mg tablet, Take 20 mg by mouth daily, Disp: , Rfl: 3    aspirin 81 mg chewable tablet, Chew 1 tablet daily (Patient not taking: Reported on 2021), Disp: , Rfl:     Fish Oil-Coenzyme Q10 (FISH OIL PLUS CO Q-10) 1000-30 MG CAPS, Take by mouth (Patient not taking: Reported on 9/5/2021), Disp: , Rfl:     sertraline (ZOLOFT) 100 mg tablet, Take 5 mg by mouth (Patient not taking: Reported on 9/5/2021), Disp: , Rfl:     sertraline (ZOLOFT) 50 mg tablet, Take 50 mg by mouth daily (Patient not taking: Reported on 9/5/2021), Disp: , Rfl:     Current Allergies     Allergies as of 09/05/2021    (No Known Allergies)            The following portions of the patient's history were reviewed and updated as appropriate: allergies, current medications, past family history, past medical history, past social history, past surgical history and problem list      Past Medical History:   Diagnosis Date    Bacterial pneumonia     Hypercholesterolemia     Hypertension     Nephrolithiasis     Pleurisy     Several episodes in past    Pulmonary emphysema ([facility] [address] )     Pulmonary nodules        Past Surgical History:   Procedure Laterality Date    HUMERUS FRACTURE SURGERY         Family History   Problem Relation Age of Onset    Hyperlipidemia Mother     Stroke Mother     Lung cancer Father         Mesothelioma    Heart disease Father     Colon cancer Father          Medications have been verified  Objective   /84   Pulse 80   Temp 98 2 °F (36 8 °C)   Resp 18   Ht 6' 4" (1 93 m)   Wt 102 kg (225 lb)   SpO2 96%   BMI 27 39 kg/m²   No LMP for male patient  Physical Exam     Physical Exam  Constitutional:       General: He is not in acute distress  Appearance: Normal appearance  Cardiovascular:      Rate and Rhythm: Normal rate and regular rhythm  Heart sounds: Normal heart sounds  No murmur heard  Pulmonary:      Effort: Pulmonary effort is normal       Breath sounds: Normal breath sounds  Abdominal:      General: Abdomen is flat  Bowel sounds are normal       Palpations: Abdomen is soft  Tenderness: There is no abdominal tenderness  There is no right CVA tenderness or left CVA tenderness  Hernia: No hernia is present     Neurological: Mental Status: He is alert

## 2021-09-05 NOTE — PATIENT INSTRUCTIONS
1  Increase fluids  2  Motrin as needed for discomfort  3   Follow-up with Urology if symptoms persist

## 2021-09-07 LAB — BACTERIA UR CULT: NORMAL

## 2022-02-03 DIAGNOSIS — J43.2 CENTRILOBULAR EMPHYSEMA (HCC): ICD-10-CM

## 2022-02-03 DIAGNOSIS — R05.3 COUGH, PERSISTENT: ICD-10-CM

## 2022-06-28 ENCOUNTER — OFFICE VISIT (OUTPATIENT)
Dept: PULMONOLOGY | Facility: CLINIC | Age: 62
End: 2022-06-28
Payer: COMMERCIAL

## 2022-06-28 ENCOUNTER — TELEPHONE (OUTPATIENT)
Dept: PULMONOLOGY | Facility: CLINIC | Age: 62
End: 2022-06-28

## 2022-06-28 VITALS
HEIGHT: 75 IN | BODY MASS INDEX: 27.6 KG/M2 | TEMPERATURE: 98.2 F | OXYGEN SATURATION: 97 % | DIASTOLIC BLOOD PRESSURE: 82 MMHG | WEIGHT: 222 LBS | HEART RATE: 71 BPM | SYSTOLIC BLOOD PRESSURE: 148 MMHG | RESPIRATION RATE: 18 BRPM

## 2022-06-28 DIAGNOSIS — J43.2 CENTRILOBULAR EMPHYSEMA (HCC): ICD-10-CM

## 2022-06-28 DIAGNOSIS — R93.89 ABNORMAL CT OF THE CHEST: Primary | ICD-10-CM

## 2022-06-28 PROCEDURE — 99214 OFFICE O/P EST MOD 30 MIN: CPT | Performed by: INTERNAL MEDICINE

## 2022-06-28 PROCEDURE — 94010 BREATHING CAPACITY TEST: CPT | Performed by: INTERNAL MEDICINE

## 2022-06-28 RX ORDER — FLUTICASONE FUROATE AND VILANTEROL TRIFENATATE 100; 25 UG/1; UG/1
1 POWDER RESPIRATORY (INHALATION) DAILY
Qty: 60 BLISTER | Refills: 5 | Status: SHIPPED | OUTPATIENT
Start: 2022-06-28 | End: 2022-07-28

## 2022-06-28 NOTE — ASSESSMENT & PLAN NOTE
· Reports slight increase in shortness of breath  · Spirometry does indicate mild interval decline in function   · He does note that he has been less active and wants to get back to riding his bike  · I did suggest that since he had COVID infection and has slight increased cough and phlegm production that we give a trial of Breo Ellipta    Two samples were given and printed prescription for continuation therapy if beneficial

## 2022-06-28 NOTE — PROGRESS NOTES
Progress note - Pulmonary Medicine   [facility]ifuentes [facility], [facility]  AND [facility] 65 y o  male MRN: [id number]       Impression & Plan:     Pulmonary emphysema ([address] )  · Reports slight increase in shortness of breath  · Spirometry does indicate mild interval decline in function   · He does note that he has been less active and wants to get back to riding his bike  · I did suggest that since he had COVID infection and has slight increased cough and phlegm production that we give a trial of Breo Ellipta  Two samples were given and printed prescription for continuation therapy if beneficial    Abnormal CT of the chest  · CT scan of the chest last year did show stable findings  Apical predominant emphysema  · Chronic mucoid impaction  · He has not had alpha-1 testing that I am aware of  I have therefore ordered the test and will discuss further with him      He will follow-up in 1 year  We will alter the schedule if increased symptoms or change  He will contact me if he wishes to continue on the Breo Ellipta  ______________________________________________________________________    HPI:    Lissy  presents today for follow-up of emphysema  He tells me he did have COVID infection about 9 weeks ago  He has had lingering cough and some phlegm production  He does not routinely wheeze but has noticed slow and gradual increase in shortness of breath  No chest pain or pleurisy  He has noticed that his activity level has been less during COVID and since his dog no longer takes outdoor walks due to advanced age  He has been using albuterol but reports that he has only used it for 5 times over the year  He is able to do routine activities but if he does something more strenuous such as lifting a case of water or carrying something heavy, he does notice shortness of breath  He previously was fairly active in rode a bike fairly regularly but has not been doing this recently  No appetite or weight change  No GERD symptoms  Does not report postnasal drip or nasal symptoms currently  Current Medications:    Current Outpatient Medications:     amLODIPine (NORVASC) 5 mg tablet, Take 5 mg by mouth daily, Disp: , Rfl: 3    benazepril (LOTENSIN) 40 MG tablet, Take 40 mg by mouth daily, Disp: , Rfl: 3    doxazosin (CARDURA) 2 mg tablet, Take 2 mg by mouth daily, Disp: , Rfl:     fluticasone-vilanterol (Breo Ellipta) 100-25 mcg/inh inhaler, Inhale 1 puff daily Rinse mouth after use , Disp: 60 blister, Rfl: 5    mometasone (ASMANEX TWISTHALER) 220 MCG/INH inhaler, Inhale 1 puff every evening Rinse mouth after use , Disp: , Rfl:     simvastatin (ZOCOR) 20 mg tablet, Take 20 mg by mouth daily, Disp: , Rfl: 3    Ventolin  (90 Base) MCG/ACT inhaler, TAKE 2 PUFFS BY MOUTH EVERY 6 HOURS AS NEEDED FOR WHEEZE, Disp: 18 g, Rfl: 6    Fish Oil-Coenzyme Q10 (FISH OIL PLUS CO Q-10) 1000-30 MG CAPS, Take by mouth (Patient not taking: No sig reported), Disp: , Rfl:     Review of Systems:    Aside from what is mentioned in the HPI, the review of systems is otherwise negative    Past medical history, surgical history, and family history were reviewed and updated as appropriate    Social history updates:  Social History     Tobacco Use   Smoking Status Former Smoker    Packs/day: 1 00    Years: 24 00    Pack years: 24 00    Types: Cigarettes    Start date: 1979   Emaline Folds Quit date: 2004    Years since quittin 5   Smokeless Tobacco Never Used       PhysicalExamination:  Vitals:   /82 (BP Location: Right arm, Patient Position: Sitting, Cuff Size: Standard)   Pulse 71   Temp 98 2 °F (36 8 °C) (Tympanic)   Resp 18   Ht 6' 3" (1 905 m)   Wt 101 kg (222 lb)   SpO2 97%   BMI 27 75 kg/m²     Gen:  Appears comfortable on room air  HEENT:  Conjugate gaze  Sclera anicteric   Oropharynx is clear, moist  Neck: Supple  There is no JVD, lymphadenopathy or thyromegaly  Trachea is midline  Chest:  Chest excursion symmetric   Lungs are hyperinflated  Breath sounds are distant bilaterally but otherwise clear  Hyper-resonant to percussion  Cardiac:  Regular  There are no murmurs  Abdomen:  Benign  Extremities: No clubbing, cyanosis or edema  Neurologic:  Normal speech and mentation  Skin: No appreciable rashes  Diagnostic Data:  Labs: I personally reviewed the most recent laboratory data pertinent to today's visit    No results found for: WBC, HGB, HCT, MCV, PLT  Lab Results   Component Value Date    SODIUM 141 04/20/2018    K 4 5 04/20/2018    CO2 29 04/20/2018     (H) 04/20/2018    BUN 9 04/20/2018    CREATININE 1 12 04/20/2018    CALCIUM 9 4 04/20/2018       PFT results: The most recent pulmonary function tests were reviewed  She was performed in the office today  This shows severe obstruction with an FEV1 of 2 06 L, 49% predicted    Has slightly declined in comparison with prior study in March 2019    Imaging:    No new imaging or pertinent diagnostic testing    Batsheva Martinez MD

## 2022-06-28 NOTE — ASSESSMENT & PLAN NOTE
· CT scan of the chest last year did show stable findings  Apical predominant emphysema  · Chronic mucoid impaction  · He has not had alpha-1 testing that I am aware of    I have therefore ordered the test and will discuss further with him

## 2023-01-16 LAB — A1AT PHENOTYP SERPL-IMP: NORMAL

## 2023-09-15 ENCOUNTER — OFFICE VISIT (OUTPATIENT)
Dept: PULMONOLOGY | Facility: CLINIC | Age: 63
End: 2023-09-15
Payer: COMMERCIAL

## 2023-09-15 VITALS
DIASTOLIC BLOOD PRESSURE: 80 MMHG | WEIGHT: 225 LBS | HEIGHT: 75 IN | OXYGEN SATURATION: 98 % | BODY MASS INDEX: 27.98 KG/M2 | TEMPERATURE: 96.8 F | HEART RATE: 74 BPM | SYSTOLIC BLOOD PRESSURE: 142 MMHG

## 2023-09-15 DIAGNOSIS — R93.89 ABNORMAL CT OF THE CHEST: ICD-10-CM

## 2023-09-15 DIAGNOSIS — J43.2 CENTRILOBULAR EMPHYSEMA (HCC): Primary | ICD-10-CM

## 2023-09-15 PROCEDURE — 99213 OFFICE O/P EST LOW 20 MIN: CPT | Performed by: INTERNAL MEDICINE

## 2023-09-15 RX ORDER — CALCIUM CARBONATE 500 MG/1
TABLET, CHEWABLE ORAL
COMMUNITY

## 2023-09-15 RX ORDER — ALBUTEROL SULFATE 90 UG/1
2 AEROSOL, METERED RESPIRATORY (INHALATION) EVERY 6 HOURS PRN
Qty: 18 G | Refills: 6 | Status: SHIPPED | OUTPATIENT
Start: 2023-09-15

## 2023-09-15 NOTE — ASSESSMENT & PLAN NOTE
· Symptoms remain stable  · Has tried multiple long-term controlling agents but has never noticed much benefit. Has tried ICS/LABA and anticholinergic bronchodilators  · Uses albuterol sparingly and renewed prescription for this.   · We will continue to use as needed albuterol

## 2023-09-15 NOTE — PROGRESS NOTES
Progress note - Pulmonary Medicine   Madelyn Perdomo [facility], [facility]. AND [facility] 59 y.o. male MRN: [id number]       Impression & Plan:     Pulmonary emphysema ([address])  · Symptoms remain stable  · Has tried multiple long-term controlling agents but has never noticed much benefit. Has tried ICS/LABA and anticholinergic bronchodilators  · Uses albuterol sparingly and renewed prescription for this. · We will continue to use as needed albuterol    Abnormal CT of the chest  · No new symptoms, repeat CT imaging not indicated  · Has some mucous plugging on remote CT from May 2021. Emphysema changes also noted. · No nodules. Does not meet lung cancer screening criteria as he quit smoking remotely      He will follow-up in the office in 1 year  ______________________________________________________________________    HPI:    Ingrid Hayden presents today for follow-up of emphysema. He has abnormal CAT scan of the chest with a region of mucous plugging. No significant nodules or other lesions. Quit smoking very remotely. Denies new symptoms. He has no new cough, phlegm production, or chest pain. He does have nasal drip and does produce phlegm first thing in the morning but the rest of the day seems to be fairly clear. He rarely has need for rescue bronchodilation. Has never noticed benefit from other inhalers including Breo, Asmanex, Spiriva. He reports no change in his medications. He does have hypertension. He is bothered by air quality. He noticed this specifically with the [facility] smoke.   He felt somewhat nauseous  but did not report increased wheezing or respiratory symptoms    Current Medications:    Current Outpatient Medications:   •  albuterol (PROVENTIL HFA,VENTOLIN HFA) 90 mcg/act inhaler, Inhale 2 puffs every 6 (six) hours as needed for wheezing, Disp: 18 g, Rfl: 6  •  amLODIPine (NORVASC) 5 mg tablet, Take 5 mg by mouth daily, Disp: , Rfl: 3  •  benazepril (LOTENSIN) 40 MG tablet, Take 40 mg by mouth daily, Disp: , Rfl: 3  •  doxazosin (CARDURA) 2 mg tablet, Take 2 mg by mouth daily, Disp: , Rfl:   •  Fish Oil-Coenzyme Q10 (FISH OIL PLUS CO Q-10) 1000-30 MG CAPS, Take by mouth, Disp: , Rfl:   •  simvastatin (ZOCOR) 20 mg tablet, Take 20 mg by mouth daily, Disp: , Rfl: 3  •  calcium carbonate (Tums) 500 mg chewable tablet, Chew, Disp: , Rfl:     Review of Systems:  Aside from what is mentioned in the HPI, the review of systems is otherwise negative    Past medical history, surgical history, and family history were reviewed and updated as appropriate    Social history updates:  Social History     Tobacco Use   Smoking Status Former   • Packs/day: 1.00   • Years: 24.00   • Total pack years: 24.00   • Types: Cigarettes   • Start date: 1979   • Quit date: 2004   • Years since quittin.7   Smokeless Tobacco Never       PhysicalExamination:  Vitals:   /80 (BP Location: Right arm, Patient Position: Sitting, Cuff Size: Large)   Pulse 74   Temp (!) 96.8 °F (36 °C) (Tympanic)   Ht 6' 3" (1.905 m)   Wt 102 kg (225 lb)   SpO2 98%   BMI 28.12 kg/m²   Gen: Appears comfortable on room air. HEENT: Conjugate gaze. Sclera anicteric . Oropharynx is clear, moist  Neck: No JVD or adenopathy  Chest: Chest excursion symmetric. Lungs are hyperinflated. Breath sounds are distant bilaterally but otherwise clear. Hyper-resonant to percussion. Cardiac: Slightly distant heart tones but regular. There are no murmurs  Abdomen: Benign  Extremities: No clubbing, cyanosis or edema. Neurologic: Normal speech and mentation.       Diagnostic Data:  No new pertinent pulmonary diagnostic testing    Daksha Bedolla MD

## 2023-09-15 NOTE — ASSESSMENT & PLAN NOTE
· No new symptoms, repeat CT imaging not indicated  · Has some mucous plugging on remote CT from May 2021. Emphysema changes also noted. · No nodules.   Does not meet lung cancer screening criteria as he quit smoking remotely

## 2024-10-10 ENCOUNTER — OFFICE VISIT (OUTPATIENT)
Dept: PULMONOLOGY | Facility: CLINIC | Age: 64
End: 2024-10-10
Payer: COMMERCIAL

## 2024-10-10 VITALS
TEMPERATURE: 98.3 F | WEIGHT: 218 LBS | BODY MASS INDEX: 26.55 KG/M2 | DIASTOLIC BLOOD PRESSURE: 80 MMHG | RESPIRATION RATE: 16 BRPM | OXYGEN SATURATION: 98 % | HEIGHT: 76 IN | HEART RATE: 76 BPM | SYSTOLIC BLOOD PRESSURE: 118 MMHG

## 2024-10-10 DIAGNOSIS — R93.89 ABNORMAL CT OF THE CHEST: ICD-10-CM

## 2024-10-10 DIAGNOSIS — J43.2 CENTRILOBULAR EMPHYSEMA (HCC): Primary | ICD-10-CM

## 2024-10-10 PROCEDURE — 99213 OFFICE O/P EST LOW 20 MIN: CPT | Performed by: INTERNAL MEDICINE

## 2024-10-10 PROCEDURE — 94010 BREATHING CAPACITY TEST: CPT | Performed by: INTERNAL MEDICINE

## 2024-10-11 PROBLEM — N40.0 BPH (BENIGN PROSTATIC HYPERPLASIA): Status: ACTIVE | Noted: 2024-10-03

## 2024-10-11 RX ORDER — DOXAZOSIN 4 MG/1
4 TABLET ORAL EVERY EVENING
COMMUNITY
Start: 2024-08-07

## 2024-10-11 NOTE — PROGRESS NOTES
Progress note - Pulmonary Medicine   Mahesh Alaniz 64 y.o. male MRN: [id number]       Impression & Plan:     Pulmonary emphysema (HCC)  Spirometry stable  Symptoms at baseline  Only uses as needed albuterol, has not tried other longer acting agents and has not felt the need to utilize them.  Continue present therapy in 1 year follow-up    Abnormal CT of the chest  Mild mucous plugging on last CT imaging but no progressive respiratory symptomatology  Does not meet lung cancer screening criteria  No imaging required at this time      Return in about 1 year (around 10/10/2025).      ______________________________________________________________________    HPI:    Mahesh Alaniz presents today for follow-up of emphysema.  He did have respiratory infection and the spring.  He went to urgent care and received a course of antibiotics.  Symptoms did improve and he feels like he is back to baseline.  His cough did remain for quite some time and it took a long time for him to improve back to baseline however.    He does have albuterol.  He does not use this routinely.  He typically only uses this with flare or respiratory infection.  He has not had typical COPD exacerbations.  He does not report substantial shortness of breath, exercise intolerance or other symptoms despite the severity of his COPD by pulmonary function testing.    Current Medications:    Current Outpatient Medications:     albuterol (PROVENTIL HFA,VENTOLIN HFA) 90 mcg/act inhaler, Inhale 2 puffs every 6 (six) hours as needed for wheezing, Disp: 18 g, Rfl: 6    amLODIPine (NORVASC) 5 mg tablet, Take 5 mg by mouth daily, Disp: , Rfl: 3    benazepril (LOTENSIN) 40 MG tablet, Take 40 mg by mouth daily, Disp: , Rfl: 3    calcium carbonate (Tums) 500 mg chewable tablet, Chew, Disp: , Rfl:     doxazosin (CARDURA) 4 mg tablet, Take 4 mg by mouth every evening, Disp: , Rfl:     Fish Oil-Coenzyme Q10 (FISH OIL PLUS CO Q-10) 1000-30 MG CAPS, Take by  "mouth, Disp: , Rfl:     simvastatin (ZOCOR) 20 mg tablet, Take 20 mg by mouth daily, Disp: , Rfl: 3    Review of Systems:  Review of Systems  Aside from what is mentioned in the HPI, the review of systems is otherwise negative    Past medical history, surgical history, and family history were reviewed and updated as appropriate    Social history updates:  Social History     Tobacco Use   Smoking Status Former    Current packs/day: 0.00    Average packs/day: 1 pack/day for 25.0 years (25.0 ttl pk-yrs)    Types: Cigarettes    Start date: 1979    Quit date: 2004    Years since quittin.7   Smokeless Tobacco Never       PhysicalExamination:  Vitals:   /80 (BP Location: Left arm, Patient Position: Sitting, Cuff Size: Adult)   Pulse 76   Temp 98.3 °F (36.8 °C) (Tympanic)   Resp 16   Ht 6' 4\" (1.93 m)   Wt 98.9 kg (218 lb)   SpO2 98%   BMI 26.54 kg/m²     Gen:  Comfortable on room air.  No conversational dyspnea  HEENT:  Conjugate gaze.  sclerae anicteric.  Oropharynx moist  Neck: Trachea is midline. No JVD. No adenopathy  Chest: Slightly distant breath sounds but otherwise clear to auscultation  Cardiac: Regular. no murmur  Abdomen:  benign  Extremities: No edema  Neuro:  Normal speech and mentation    Diagnostic Data:  PFT results:  The most recent pulmonary function tests were reviewed.  Spirometry performed today is unchanged from 2 years prior.  Moderate/borderline severe obstruction with obstructive appearing flow-volume loop.  Ratio 45%.  FEV1 1.98 L, 47% predicted    Imaging:  I personally reviewed the images on the PAC system pertinent to today's visit  No recent pulmonary imaging.  Last CT scan May 2021 consistent with emphysema.  No nodules.    Does not meet lung cancer screening criteria due to remote cessation over 20 years ago    César Pereira MD  "

## 2024-10-11 NOTE — ASSESSMENT & PLAN NOTE
Mild mucous plugging on last CT imaging but no progressive respiratory symptomatology  Does not meet lung cancer screening criteria  No imaging required at this time

## 2024-10-11 NOTE — ASSESSMENT & PLAN NOTE
Spirometry stable  Symptoms at baseline  Only uses as needed albuterol, has not tried other longer acting agents and has not felt the need to utilize them.  Continue present therapy in 1 year follow-up